# Patient Record
Sex: FEMALE | Race: WHITE | HISPANIC OR LATINO | Employment: FULL TIME | URBAN - METROPOLITAN AREA
[De-identification: names, ages, dates, MRNs, and addresses within clinical notes are randomized per-mention and may not be internally consistent; named-entity substitution may affect disease eponyms.]

---

## 2023-12-18 ENCOUNTER — PHARMACY VISIT (OUTPATIENT)
Dept: PHARMACY | Facility: CLINIC | Age: 43
End: 2023-12-18
Payer: COMMERCIAL

## 2023-12-18 PROCEDURE — RXMED WILLOW AMBULATORY MEDICATION CHARGE

## 2024-01-02 ENCOUNTER — LAB REQUISITION (OUTPATIENT)
Dept: LAB | Facility: HOSPITAL | Age: 44
End: 2024-01-02

## 2024-01-02 DIAGNOSIS — U07.1 COVID-19: ICD-10-CM

## 2024-01-02 LAB — SARS-COV-2 RNA RESP QL NAA+PROBE: DETECTED

## 2024-01-02 PROCEDURE — 87635 SARS-COV-2 COVID-19 AMP PRB: CPT

## 2024-01-10 PROCEDURE — RXMED WILLOW AMBULATORY MEDICATION CHARGE

## 2024-01-13 PROCEDURE — RXMED WILLOW AMBULATORY MEDICATION CHARGE

## 2024-01-15 ENCOUNTER — PHARMACY VISIT (OUTPATIENT)
Dept: PHARMACY | Facility: CLINIC | Age: 44
End: 2024-01-15
Payer: COMMERCIAL

## 2024-01-17 ENCOUNTER — PHARMACY VISIT (OUTPATIENT)
Dept: PHARMACY | Facility: CLINIC | Age: 44
End: 2024-01-17
Payer: COMMERCIAL

## 2024-02-20 ENCOUNTER — PHARMACY VISIT (OUTPATIENT)
Dept: PHARMACY | Facility: CLINIC | Age: 44
End: 2024-02-20
Payer: COMMERCIAL

## 2024-02-20 PROCEDURE — RXMED WILLOW AMBULATORY MEDICATION CHARGE

## 2024-02-20 RX ORDER — METHYLPHENIDATE HYDROCHLORIDE 20 MG/1
TABLET ORAL
Qty: 90 TABLET | Refills: 0 | OUTPATIENT
Start: 2024-02-20 | End: 2024-03-21 | Stop reason: SDUPTHER

## 2024-03-21 PROCEDURE — RXMED WILLOW AMBULATORY MEDICATION CHARGE

## 2024-03-22 ENCOUNTER — PHARMACY VISIT (OUTPATIENT)
Dept: PHARMACY | Facility: CLINIC | Age: 44
End: 2024-03-22
Payer: COMMERCIAL

## 2024-04-23 ENCOUNTER — PHARMACY VISIT (OUTPATIENT)
Dept: PHARMACY | Facility: CLINIC | Age: 44
End: 2024-04-23
Payer: COMMERCIAL

## 2024-04-23 PROCEDURE — RXMED WILLOW AMBULATORY MEDICATION CHARGE

## 2024-04-23 RX ORDER — METHYLPHENIDATE HYDROCHLORIDE 20 MG/1
TABLET ORAL
Qty: 90 TABLET | Refills: 0 | OUTPATIENT
Start: 2024-04-22 | End: 2024-05-21 | Stop reason: SDUPTHER

## 2024-04-25 ENCOUNTER — HOSPITAL ENCOUNTER (EMERGENCY)
Facility: HOSPITAL | Age: 44
Discharge: HOME | End: 2024-04-25
Attending: EMERGENCY MEDICINE
Payer: COMMERCIAL

## 2024-04-25 VITALS
DIASTOLIC BLOOD PRESSURE: 92 MMHG | HEART RATE: 87 BPM | BODY MASS INDEX: 47.12 KG/M2 | SYSTOLIC BLOOD PRESSURE: 147 MMHG | WEIGHT: 240 LBS | RESPIRATION RATE: 18 BRPM | OXYGEN SATURATION: 96 % | HEIGHT: 60 IN | TEMPERATURE: 97.3 F
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
## 2024-04-25 PROCEDURE — 99283 EMERGENCY DEPT VISIT LOW MDM: CPT

## 2024-04-25 PROCEDURE — 99283 EMERGENCY DEPT VISIT LOW MDM: CPT | Performed by: EMERGENCY MEDICINE

## 2024-04-25 PROCEDURE — 36415 COLL VENOUS BLD VENIPUNCTURE: CPT | Performed by: EMERGENCY MEDICINE

## 2024-04-25 PROCEDURE — 99281 EMR DPT VST MAYX REQ PHY/QHP: CPT

## 2024-04-25 ASSESSMENT — COLUMBIA-SUICIDE SEVERITY RATING SCALE - C-SSRS
6. HAVE YOU EVER DONE ANYTHING, STARTED TO DO ANYTHING, OR PREPARED TO DO ANYTHING TO END YOUR LIFE?: NO
2. HAVE YOU ACTUALLY HAD ANY THOUGHTS OF KILLING YOURSELF?: NO
1. IN THE PAST MONTH, HAVE YOU WISHED YOU WERE DEAD OR WISHED YOU COULD GO TO SLEEP AND NOT WAKE UP?: NO

## 2024-04-25 NOTE — ED PROVIDER NOTES
Limitations to History: None     HPI:      Ginette Burr is a 44 y.o. who presents to the ED with a needlestick in the OR.  She grabbed a suture that had been put back down the [employer] stand.  She immediately milked her finger and poured alcohol on it.  The source patient does not have any known risk factors for HIV or hep C.   ------------------------------------------------------------------------------------------------------------------------------------------    VS: BP (!) 147/92   Pulse 87   Temp 36.3 °C (97.3 °F)   Resp 18   Ht 1.524 m (5')   Wt 109 kg (240 lb)   SpO2 96%   BMI 46.87 kg/m²     Physical Exam:  Gen: Alert, NAD  L middle digit with small puncture wound.       ------------------------------------------------------------------------------------------------------------------------------------------    Medical Decision Making: Patient presents emergency room after needlestick.  Paperwork was filled out and both source patient and her blood was sent.  Very low risk for transmission so did not want postexposure prophylaxis at this time.    Diagnoses as of 04/25/24 1958   Accidental hypodermic needlestick injury       Medications - No data to display         Aristides Pool MD  04/25/24 2001

## 2024-04-25 NOTE — ED TRIAGE NOTES
Pt to ED c/o needlestick to ring finger on right hand, bleeding controlled, pt state she washed and cleaned out her finger. Pt states she was knicked by a suture in the OR.

## 2024-05-10 PROCEDURE — RXMED WILLOW AMBULATORY MEDICATION CHARGE

## 2024-05-13 ENCOUNTER — PHARMACY VISIT (OUTPATIENT)
Dept: PHARMACY | Facility: CLINIC | Age: 44
End: 2024-05-13
Payer: COMMERCIAL

## 2024-05-21 PROCEDURE — RXMED WILLOW AMBULATORY MEDICATION CHARGE

## 2024-05-22 ENCOUNTER — PHARMACY VISIT (OUTPATIENT)
Dept: PHARMACY | Facility: CLINIC | Age: 44
End: 2024-05-22
Payer: COMMERCIAL

## 2024-06-19 PROCEDURE — RXMED WILLOW AMBULATORY MEDICATION CHARGE

## 2024-06-24 ENCOUNTER — PHARMACY VISIT (OUTPATIENT)
Dept: PHARMACY | Facility: CLINIC | Age: 44
End: 2024-06-24
Payer: COMMERCIAL

## 2024-07-22 PROCEDURE — RXMED WILLOW AMBULATORY MEDICATION CHARGE

## 2024-07-25 ENCOUNTER — PHARMACY VISIT (OUTPATIENT)
Dept: PHARMACY | Facility: CLINIC | Age: 44
End: 2024-07-25
Payer: COMMERCIAL

## 2024-08-27 PROCEDURE — RXMED WILLOW AMBULATORY MEDICATION CHARGE

## 2024-08-28 ENCOUNTER — PHARMACY VISIT (OUTPATIENT)
Dept: PHARMACY | Facility: CLINIC | Age: 44
End: 2024-08-28
Payer: COMMERCIAL

## 2024-10-04 PROCEDURE — RXMED WILLOW AMBULATORY MEDICATION CHARGE

## 2024-10-05 ENCOUNTER — PHARMACY VISIT (OUTPATIENT)
Dept: PHARMACY | Facility: CLINIC | Age: 44
End: 2024-10-05
Payer: COMMERCIAL

## 2024-10-28 ENCOUNTER — APPOINTMENT (OUTPATIENT)
Dept: PRIMARY CARE | Facility: CLINIC | Age: 44
End: 2024-10-28
Payer: COMMERCIAL

## 2024-11-08 ENCOUNTER — PHARMACY VISIT (OUTPATIENT)
Dept: PHARMACY | Facility: CLINIC | Age: 44
End: 2024-11-08
Payer: COMMERCIAL

## 2024-11-08 PROCEDURE — RXMED WILLOW AMBULATORY MEDICATION CHARGE

## 2024-11-08 RX ORDER — METHYLPHENIDATE HYDROCHLORIDE 20 MG/1
30 TABLET ORAL 2 TIMES DAILY
Qty: 90 TABLET | Refills: 0 | OUTPATIENT
Start: 2024-11-08

## 2024-12-03 ENCOUNTER — PHARMACY VISIT (OUTPATIENT)
Dept: PHARMACY | Facility: CLINIC | Age: 44
End: 2024-12-03
Payer: COMMERCIAL

## 2024-12-03 PROCEDURE — RXMED WILLOW AMBULATORY MEDICATION CHARGE

## 2024-12-03 RX ORDER — PAROXETINE HYDROCHLORIDE 40 MG/1
TABLET, FILM COATED ORAL
Qty: 90 TABLET | Refills: 1 | OUTPATIENT
Start: 2024-12-03

## 2024-12-03 RX ORDER — METHYLPHENIDATE HYDROCHLORIDE 20 MG/1
30 TABLET ORAL 2 TIMES DAILY
Qty: 90 TABLET | Refills: 0 | OUTPATIENT
Start: 2024-12-03

## 2024-12-10 ENCOUNTER — PHARMACY VISIT (OUTPATIENT)
Dept: PHARMACY | Facility: CLINIC | Age: 44
End: 2024-12-10
Payer: COMMERCIAL

## 2024-12-10 PROCEDURE — RXMED WILLOW AMBULATORY MEDICATION CHARGE

## 2025-01-15 PROCEDURE — RXMED WILLOW AMBULATORY MEDICATION CHARGE

## 2025-01-16 ENCOUNTER — PHARMACY VISIT (OUTPATIENT)
Dept: PHARMACY | Facility: CLINIC | Age: 45
End: 2025-01-16
Payer: COMMERCIAL

## 2025-02-19 ENCOUNTER — PHARMACY VISIT (OUTPATIENT)
Dept: PHARMACY | Facility: CLINIC | Age: 45
End: 2025-02-19
Payer: COMMERCIAL

## 2025-02-19 PROCEDURE — RXMED WILLOW AMBULATORY MEDICATION CHARGE

## 2025-03-08 PROCEDURE — RXMED WILLOW AMBULATORY MEDICATION CHARGE

## 2025-03-09 ENCOUNTER — PHARMACY VISIT (OUTPATIENT)
Dept: PHARMACY | Facility: CLINIC | Age: 45
End: 2025-03-09
Payer: COMMERCIAL

## 2025-03-24 ENCOUNTER — PHARMACY VISIT (OUTPATIENT)
Dept: PHARMACY | Facility: CLINIC | Age: 45
End: 2025-03-24
Payer: COMMERCIAL

## 2025-03-24 PROCEDURE — RXMED WILLOW AMBULATORY MEDICATION CHARGE

## 2025-03-24 RX ORDER — METHYLPHENIDATE HYDROCHLORIDE 20 MG/1
TABLET ORAL
Qty: 90 TABLET | Refills: 0 | OUTPATIENT
Start: 2025-03-23

## 2025-04-17 ENCOUNTER — HOSPITAL ENCOUNTER (OUTPATIENT)
Dept: RADIOLOGY | Facility: HOSPITAL | Age: 45
Discharge: HOME | End: 2025-04-17
Payer: COMMERCIAL

## 2025-04-17 ENCOUNTER — OFFICE VISIT (OUTPATIENT)
Dept: ORTHOPEDIC SURGERY | Facility: HOSPITAL | Age: 45
End: 2025-04-17
Payer: COMMERCIAL

## 2025-04-17 VITALS — HEIGHT: 60 IN | WEIGHT: 235 LBS | BODY MASS INDEX: 46.13 KG/M2

## 2025-04-17 DIAGNOSIS — M47.817 DJD (DEGENERATIVE JOINT DISEASE), LUMBOSACRAL: ICD-10-CM

## 2025-04-17 DIAGNOSIS — M25.551 RIGHT HIP PAIN: ICD-10-CM

## 2025-04-17 DIAGNOSIS — M16.11 OSTEOARTHRITIS OF RIGHT HIP, UNSPECIFIED OSTEOARTHRITIS TYPE: Primary | ICD-10-CM

## 2025-04-17 PROCEDURE — RXMED WILLOW AMBULATORY MEDICATION CHARGE

## 2025-04-17 PROCEDURE — 1036F TOBACCO NON-USER: CPT | Performed by: SPECIALIST/TECHNOLOGIST

## 2025-04-17 PROCEDURE — 99214 OFFICE O/P EST MOD 30 MIN: CPT | Performed by: SPECIALIST/TECHNOLOGIST

## 2025-04-17 PROCEDURE — 73502 X-RAY EXAM HIP UNI 2-3 VIEWS: CPT | Mod: RIGHT SIDE | Performed by: STUDENT IN AN ORGANIZED HEALTH CARE EDUCATION/TRAINING PROGRAM

## 2025-04-17 PROCEDURE — 3008F BODY MASS INDEX DOCD: CPT | Performed by: SPECIALIST/TECHNOLOGIST

## 2025-04-17 PROCEDURE — 73502 X-RAY EXAM HIP UNI 2-3 VIEWS: CPT | Mod: RT

## 2025-04-17 PROCEDURE — 99204 OFFICE O/P NEW MOD 45 MIN: CPT | Performed by: SPECIALIST/TECHNOLOGIST

## 2025-04-17 RX ORDER — MELOXICAM 15 MG/1
15 TABLET ORAL DAILY
Qty: 14 TABLET | Refills: 0 | Status: SHIPPED | OUTPATIENT
Start: 2025-04-17 | End: 2025-05-02

## 2025-04-17 RX ORDER — TIRZEPATIDE 2.5 MG/0.1
SYRINGE (ML) SUBCUTANEOUS WEEKLY
COMMUNITY

## 2025-04-17 NOTE — PROGRESS NOTES
"HPI  This is a pleasant 45 y.o. female here today for right hip pain.  She states the pain has been present for 1 month of unknown onset.  She describes an achy, throbby and occasional \"popping\" sensation over the posterior and lateral hip and deep inside the hip.  Pain is worsened with prolonged standing and walking.  She has been using Tylenol/Motrin and icing.  Denies prior hip injuries.  Denies numbness or tingling into the right lower extremity.  She presents for treatment recommendations.        Medical History[1]    Surgical History[2]    Social History[3]       ROS  Review of systems reviewed and pertinent positives mentioned in HPI.      PHYSICAL EXAM  There is  pain with a resisted situp.    There is not abdominal distention or tenderness    The patient's range of motion reveals that they have 90° of hip flexion on the affected side.   ER 40 degrees.   IR 20 degrees  Hip extension to 10°   Abduction to 45°      The patient is 5 out of 5 strength with resisted hip AB, adduction, hamstring and quadriceps testing.    No pain over the hip flexor, ASIS.  No pain over the proximal hamstring, piriformis      Pain Provacation testing:    Positive impingement sign,with a painful arc from 12 to 3:00.  Negative Psoas impingement/Mabel test  Negative instability, Log roll.  Positive subspine impingement test, which is pain with straight hip flexion.    Negative straight leg raise.  Negative circumduction clunk.      Peritrochanteric space examination:  Tenderness over the lindsey-trochanteric space - Yes  pain over the posterior trochanter - No      IMAGING  X-rays reviewed reveal no gross fracture or dislocation. and mild degenerative changes noted.    MRI reviewed reveals No MRI available for review      ASSESSMENT/PLAN  This is a 45 y.o. female patient here today with significant hip pain secondary to Right osteoarthritis  The patient I discussed her clinical presentation and physical exam findings consistent with " right hip osteoarthritis.  We agreed upon conservative treatment.  We agreed upon meloxicam 15 mg take daily with food for the next 14 days.  She can additionally take two 500 mg extra strength Tylenol up to 3 times a day as needed for pain.  She was provided with a referral to physical therapy to focus on glutes, core strength and stability, in office modalities, lower extremity flexibility and home exercise program.  We discussed that she does have some degenerative changes seen of her lower lumbar spine worse at L5-S1 however we did not get dedicated lumbar films on today's visit.  These changes were seen on her hip x-rays.  We discussed if symptoms were to persist that she should formally be evaluated for her lumbar spine.  We also discussed the use of an intra-articular corticosteroid injection into the right hip if her symptoms continue to persist or worsen.  She will follow-up with one of our primary care sports medicine physicians if her symptoms persist or worsen for consultation for an intra-articular corticosteroid injection.  She is in agreement the plan.  Her questions have been answered.       [1] No past medical history on file.  [2] No past surgical history on file.  [3]   Social History  Tobacco Use    Smoking status: Never    Smokeless tobacco: Never

## 2025-04-18 ENCOUNTER — PHARMACY VISIT (OUTPATIENT)
Dept: PHARMACY | Facility: CLINIC | Age: 45
End: 2025-04-18
Payer: COMMERCIAL

## 2025-04-25 ENCOUNTER — PHARMACY VISIT (OUTPATIENT)
Dept: PHARMACY | Facility: CLINIC | Age: 45
End: 2025-04-25
Payer: COMMERCIAL

## 2025-04-25 PROCEDURE — RXMED WILLOW AMBULATORY MEDICATION CHARGE

## 2025-04-25 RX ORDER — METHYLPHENIDATE HYDROCHLORIDE 20 MG/1
TABLET ORAL
Qty: 90 TABLET | Refills: 0 | OUTPATIENT
Start: 2025-04-24

## 2025-05-16 ENCOUNTER — EVALUATION (OUTPATIENT)
Dept: PHYSICAL THERAPY | Facility: CLINIC | Age: 45
End: 2025-05-16
Payer: COMMERCIAL

## 2025-05-16 DIAGNOSIS — M16.11 OSTEOARTHRITIS OF RIGHT HIP, UNSPECIFIED OSTEOARTHRITIS TYPE: ICD-10-CM

## 2025-05-16 DIAGNOSIS — M47.817 DJD (DEGENERATIVE JOINT DISEASE), LUMBOSACRAL: ICD-10-CM

## 2025-05-16 DIAGNOSIS — M54.50 LOW BACK PAIN: ICD-10-CM

## 2025-05-16 DIAGNOSIS — M25.551 RIGHT HIP PAIN: Primary | ICD-10-CM

## 2025-05-16 PROCEDURE — 97110 THERAPEUTIC EXERCISES: CPT | Mod: GP

## 2025-05-16 PROCEDURE — 97161 PT EVAL LOW COMPLEX 20 MIN: CPT | Mod: GP

## 2025-05-16 NOTE — PROGRESS NOTES
"Patient Name: Ginette Burr  MRN: [id number]  Today's Date: 5/17/2025  Visit #1  Time Calculation  Start Time: 0700  Stop Time: 0745  Time Calculation (min): 45 min  *2025 // PA REQ // 0% coins, no ded, $1600 / $3200 oop ($0 met S/F), $25 copay, 30v PT/OT radha yr (0v used as of 4/30/2025), PA req per [employer] (ref# [id number])    [facility]  Obtain PA thru [employer] (fax form)  [id number] [phone] [id number] covered    Referring Provider: Oj Ivory MD    Subjective:  Chief Complaint: Pt complains of 3-4 month history of progressively worsening right low back and hip pain with increasing symptoms over the past 1-2 months. She denies any recent trauma, she denies numbness and tingling at this time. She states that her right leg feels tighter than her left. Additionally she reports right neck and shoulder pain, which she would like addressed but has been deferred due to time constraints and prioritization of low back/ hip symptoms.   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0cm)  Standing posture reveals slight right pelvic drop and lateral weight shift    Joint Mobility and Palpation:  TTP R SIJ, " gluteus medius, piriformis  Mild lumbar paraspinal tenderness on R with decreased tissue extensibility    Functional Assessment:  Squat: Full depth  Single Leg Stance (L/R): L <15s with ankle strategy, R <15s with mild discomfort, step strategy  Heel Raise: Full depth x5  Gait: Pelvic drop during stance phase    Lumbar ROM  Flexion: Full   Extension: Full  Side bend (L/R): Full/Full  Rotation (L/R): Full/Full    Hip ROM (L/R)  Flexion: 125°/125°  Abduction: 45°/40°  Extension: 10°/6°  External Rotation: 45°/45°  Internal rotation: 35°/28°    Specific Lower Extremity MMT (L/R)  Iliopsoas: 5/5, 4/5  Gluteus Ovidio (prone): 5/5, 4-/5  Hamstrings: 5/5, 4/5  Gluteus Medius: 5/5, 4/5  Core: 4/5    LQ Neuro Screen (L/R)  Myotomes:  L2 Hip Flexion: 5/5, 4/5  L3 Knee Extension: 5/5, 4+/5  L4 Ankle Dorsiflexion: 5/5, 5/5  L5 Great Toe Extension: 5/5, 5/5    Dermatomes intact BLE    Special Tests  MARIANNA: Positive R  SIJ Compression: Mild positive R  SLR: Negative  Scour: Negative    Treatment:  PT Evaluation Time Entry  PT Evaluation (Low) Time Entry: 20  PT Therapeutic Procedures Time Entry  Manual Therapy Time Entry: 5  Therapeutic Exercise Time Entry: 20  HEP Initiated and Reviewed  Access Code: [email or web address]  Exercises  - Supine Hip Adduction Isometric with Ball  - 2 x daily - 7 x weekly - 2 sets - 10 reps - 10s hold  - Prone Hip Extension  - 2 x daily - 7 x weekly - 2 sets - 10 reps  - Supine Posterior Pelvic Tilt  - 2 x daily - 7 x weekly - 2 sets - 10 reps - 5s hold  - 90/90 SI Joint Self-Correction with Dowel  - 2 x daily - 7 x weekly - 2 sets - 3 reps - 15s hold  Education regarding prevalent anatomy, activity modifications, and prognosis     Assessment:   Ginette is a 45 year old female who presents with acute right hip and low back pain. She demonstrates signs and symptoms consistent with sacroiliac joint pathology. Tests and measures indicate lumbopelvic instability and right lower extremity weakness. A mild leg length  discrepancy was identified which may contribute to her current symptoms. Pt would benefit from skilled PT intervention to address deficits and return to prior functional levels. Pt treatment includes: manual techniques to decrease pain and improve joint/soft-tissue mobility, as well as exercises to increase strength, endurance, and neuromotor control.      This patient's clinical presentation demonstrates minimal factors influencing daily activities, indicating a low complexity. There are no significant comorbidities that complicate the desired treatment plan.  PT services are warranted in order to account measurable change in the above outcome measures and achieve improvements in the patient's functional status as well as prevention of future episodes.     Plan:   Planned interventions include: education/instruction, manual therapy, neuromuscular re-education, self care/home management, therapeutic activities and therapeutic exercises.   Potential to achieve rehab goals: Good     POC: 1-2x per week/ x20 visits    Goals:   Pt will demonstrate compliance and independence to home exercise program.   Pt will improve right hip internal rotation to >30 degrees without pain to improve hip mobility.  Pt will tolerate >45 minutes of therapeutic exercise to improve endurance.  Pt will increase right hip strength to 5/5 to improve lumbopelvic stability.  Pt will ambulate >1 mile without reports of symptoms to improve walking tolerance.     Mi Quijano, PT, DPT

## 2025-05-19 PROCEDURE — RXMED WILLOW AMBULATORY MEDICATION CHARGE

## 2025-05-20 ENCOUNTER — APPOINTMENT (OUTPATIENT)
Dept: PHYSICAL THERAPY | Facility: CLINIC | Age: 45
End: 2025-05-20
Payer: COMMERCIAL

## 2025-05-22 ENCOUNTER — TREATMENT (OUTPATIENT)
Dept: PHYSICAL THERAPY | Facility: CLINIC | Age: 45
End: 2025-05-22
Payer: COMMERCIAL

## 2025-05-22 DIAGNOSIS — M16.11 OSTEOARTHRITIS OF RIGHT HIP, UNSPECIFIED OSTEOARTHRITIS TYPE: ICD-10-CM

## 2025-05-22 DIAGNOSIS — M54.41 ACUTE MIDLINE LOW BACK PAIN WITH RIGHT-SIDED SCIATICA: ICD-10-CM

## 2025-05-22 DIAGNOSIS — M47.817 DJD (DEGENERATIVE JOINT DISEASE), LUMBOSACRAL: ICD-10-CM

## 2025-05-22 DIAGNOSIS — M25.551 RIGHT HIP PAIN: Primary | ICD-10-CM

## 2025-05-22 PROCEDURE — 97110 THERAPEUTIC EXERCISES: CPT | Mod: GP

## 2025-05-22 PROCEDURE — 97140 MANUAL THERAPY 1/> REGIONS: CPT | Mod: GP

## 2025-05-22 NOTE — PROGRESS NOTES
Physical Therapy Treatment      Patient Name: Ginette Burr  MRN: [id number]  Today's Date: 5/23/2025  Visit #2  Time Calculation  Start Time: 0745  Stop Time: 0830  Time Calculation (min): 45 min    Insurance:  *2025 // PA REQ // 0% coins, no ded, $1600 / $3200 oop ($0 met S/F), $25 copay, 30v PT/OT radha yr (0v used as of 4/30/2025), PA req per [facility] (ref# [id number])     [facility]  Obtain PA thru [employer] (fax form)  [id number] [phone] covered    Assessment:  Ginette is being treated for acute right hip pain. Gross assessment demonstrates that her hip pain may be secondary to thoracic spine hypomobility. Limited mobility in the thoracic region is likely contributing to compensatory movement patterns, increasing stress and demand on the hip joint during functional activities. This altered biomechanics may be a contributing factor to the patient’s reported hip discomfort. Focus will be placed on improving thoracic mobility to help restore optimal movement patterns and reduce compensatory hip strain.  Pt will continue to benefit from skilled PT interventions to address deficits and return to prior functional levels. Treatment includes: manual techniques to decrease pain and improve joint/soft-tissue mobility, as well as exercises to increase strength, endurance, and neuromotor control. Home exercise program has been updated to augment this session.    Plan:  Continue per POC. Discuss shoe lift due to leg length discrepancy     Current Problem:   1. Right hip pain        2. DJD (degenerative joint disease), lumbosacral        3. Osteoarthritis of right hip, unspecified osteoarthritis type        4. Acute midline low back pain with right-sided sciatica            Subjective   Ginette states she has not been doing her exercises a lot since her initial visit. She does however complain of right sided neck pain that has persisted for as long as the hip pain. It is a dull/ achy 4-5/10. Her hip pain is not as persistent  upon arrival.     Objective   Thoracic spine hypomobility  Decreased periscapular strength (L/R)  Upper Trapezius: 4+/5, 4/5  Middle Trapezius: 4+/5, 4-/5  Lower Trapezius: 4/5, 4-/6  Limited thoracic rotation, forward flexion, extension  Decreased tissue extensibility of right trapezius muscle (upper and middle fibers)    Treatments:  PT Therapeutic Procedures Time Entry  Manual Therapy Time Entry: 15  Therapeutic Exercise Time Entry: 30    Therapeutic Exercise (92545):  HEP review  Access Code: [email or web address]   Nustep *NV  Seated thoracic extension + FR    Manual Therapy (14778):  Lateral hip distraction  Thoracic   STM trapezius     Education and discussion on HEP and treatment regarding the benefits related to current condition, POC, pathophysiology, and precautions     no

## 2025-05-28 ENCOUNTER — PHARMACY VISIT (OUTPATIENT)
Dept: PHARMACY | Facility: CLINIC | Age: 45
End: 2025-05-28
Payer: COMMERCIAL

## 2025-05-28 PROCEDURE — RXMED WILLOW AMBULATORY MEDICATION CHARGE

## 2025-05-29 ENCOUNTER — APPOINTMENT (OUTPATIENT)
Dept: PHYSICAL THERAPY | Facility: CLINIC | Age: 45
End: 2025-05-29
Payer: COMMERCIAL

## 2025-05-30 ENCOUNTER — PHARMACY VISIT (OUTPATIENT)
Dept: PHARMACY | Facility: CLINIC | Age: 45
End: 2025-05-30
Payer: COMMERCIAL

## 2025-06-20 ENCOUNTER — APPOINTMENT (OUTPATIENT)
Dept: PRIMARY CARE | Facility: CLINIC | Age: 45
End: 2025-06-20
Payer: COMMERCIAL

## 2025-06-21 ENCOUNTER — APPOINTMENT (OUTPATIENT)
Dept: RADIOLOGY | Facility: HOSPITAL | Age: 45
End: 2025-06-21
Payer: COMMERCIAL

## 2025-06-21 DIAGNOSIS — Z12.31 SCREENING MAMMOGRAM FOR BREAST CANCER: ICD-10-CM

## 2025-07-03 ENCOUNTER — PHARMACY VISIT (OUTPATIENT)
Dept: PHARMACY | Facility: CLINIC | Age: 45
End: 2025-07-03
Payer: COMMERCIAL

## 2025-07-03 PROCEDURE — RXMED WILLOW AMBULATORY MEDICATION CHARGE

## 2025-07-17 ENCOUNTER — APPOINTMENT (OUTPATIENT)
Dept: PRIMARY CARE | Facility: CLINIC | Age: 45
End: 2025-07-17
Payer: COMMERCIAL

## 2025-07-17 ENCOUNTER — APPOINTMENT (OUTPATIENT)
Dept: RADIOLOGY | Facility: HOSPITAL | Age: 45
End: 2025-07-17
Payer: COMMERCIAL

## 2025-08-01 PROCEDURE — RXMED WILLOW AMBULATORY MEDICATION CHARGE

## 2025-08-03 ENCOUNTER — PHARMACY VISIT (OUTPATIENT)
Dept: PHARMACY | Facility: CLINIC | Age: 45
End: 2025-08-03
Payer: COMMERCIAL

## 2025-08-12 ENCOUNTER — APPOINTMENT (OUTPATIENT)
Dept: PRIMARY CARE | Facility: CLINIC | Age: 45
End: 2025-08-12
Payer: COMMERCIAL

## 2025-08-16 ENCOUNTER — APPOINTMENT (OUTPATIENT)
Dept: RADIOLOGY | Facility: HOSPITAL | Age: 45
End: 2025-08-16
Payer: COMMERCIAL

## 2025-09-03 ENCOUNTER — PHARMACY VISIT (OUTPATIENT)
Dept: PHARMACY | Facility: CLINIC | Age: 45
End: 2025-09-03
Payer: COMMERCIAL

## 2025-09-03 PROCEDURE — RXMED WILLOW AMBULATORY MEDICATION CHARGE

## 2025-09-03 RX ORDER — METHYLPHENIDATE HYDROCHLORIDE 20 MG/1
30 TABLET ORAL
Qty: 90 TABLET | Refills: 0 | OUTPATIENT
Start: 2025-09-03 | End: 2025-10-03